# Patient Record
Sex: FEMALE | Race: OTHER | URBAN - METROPOLITAN AREA
[De-identification: names, ages, dates, MRNs, and addresses within clinical notes are randomized per-mention and may not be internally consistent; named-entity substitution may affect disease eponyms.]

---

## 2022-04-04 ENCOUNTER — EMERGENCY (EMERGENCY)
Facility: HOSPITAL | Age: 52
LOS: 0 days | Discharge: HOME | End: 2022-04-04
Attending: EMERGENCY MEDICINE
Payer: MEDICAID

## 2022-04-04 VITALS
WEIGHT: 184.97 LBS | TEMPERATURE: 98 F | RESPIRATION RATE: 14 BRPM | SYSTOLIC BLOOD PRESSURE: 146 MMHG | HEART RATE: 85 BPM | HEIGHT: 64 IN | DIASTOLIC BLOOD PRESSURE: 74 MMHG | OXYGEN SATURATION: 98 %

## 2022-04-04 DIAGNOSIS — M72.2 PLANTAR FASCIAL FIBROMATOSIS: ICD-10-CM

## 2022-04-04 DIAGNOSIS — M79.671 PAIN IN RIGHT FOOT: ICD-10-CM

## 2022-04-04 PROCEDURE — 73610 X-RAY EXAM OF ANKLE: CPT | Mod: 26,RT

## 2022-04-04 PROCEDURE — 99283 EMERGENCY DEPT VISIT LOW MDM: CPT

## 2022-04-04 RX ORDER — IBUPROFEN 200 MG
600 TABLET ORAL ONCE
Refills: 0 | Status: COMPLETED | OUTPATIENT
Start: 2022-04-04 | End: 2022-04-04

## 2022-04-04 RX ORDER — IBUPROFEN 200 MG
1 TABLET ORAL
Qty: 20 | Refills: 0
Start: 2022-04-04 | End: 2022-04-08

## 2022-04-04 RX ADMIN — Medication 600 MILLIGRAM(S): at 10:12

## 2022-04-04 NOTE — ED PROVIDER NOTE - OBJECTIVE STATEMENT
51 year old female with no PMH who presents to the ED for right foot pain for past 15 days. Pt states she had a fall 4 years ago where she twisted her ankle. Pt reports pain is in the heel that is worse in the morning but improves as she walks.

## 2022-04-04 NOTE — ED ADULT TRIAGE NOTE - CHIEF COMPLAINT QUOTE
Right ankle pain and swelling s/p fracture 4 years ago. Patient unable to put entire foot down when walking.

## 2022-04-04 NOTE — ED PROVIDER NOTE - CARE PROVIDER_API CALL
Demond Hernandes (DO)  Orthopaedic Surgery  [address] [place] [address]  Phone: [phone]  Fax: [phone]  Follow Up Time: 4-6 Days

## 2022-04-04 NOTE — ED ADULT NURSE NOTE - OBJECTIVE STATEMENT
Patient received with complaint of pain to R ankle since last night, pt states she sprained her ankle 4 years ago. Ice pack applied to site

## 2022-04-04 NOTE — ED PROVIDER NOTE - PATIENT PORTAL LINK FT
You can access the FollowMyHealth Patient Portal offered by [facility] by registering at the following website: [email or web address]. By joining [employer]’s FollowMyHealth portal, you will also be able to view your health information using other applications (apps) compatible with our system.

## 2022-04-04 NOTE — ED PROVIDER NOTE - CLINICAL SUMMARY MEDICAL DECISION MAKING FREE TEXT BOX
DDx: plantar fasciitis/ unlikely fracture given no trauma recently.  Plan: Pain control, X-ray, stretching exercises, and d/c.

## 2022-04-04 NOTE — ED PROVIDER NOTE - MUSCULOSKELETAL, MLM
Spine appears normal, range of motion is not limited, pain with dorsiflexion in heel, slight tenderness in heel, no ankle pain or swelling.

## 2022-04-04 NOTE — ED PROVIDER NOTE - PROGRESS NOTE DETAILS
Pt is feeling better, xray negative for acute fracuture on my read. Pt is ambulating and bearing weight. Pt given return precautions medications, and is ready for d/c.

## 2022-04-04 NOTE — ED PROVIDER NOTE - CPE EDP GASTRO NORM
Health Maintenance Due   Topic Date Due   • Shingles Vaccine (1 of 2) 04/05/2008   • DTaP/Tdap/Td Vaccine (1 - Tdap) 06/14/2008   • Influenza Vaccine (1) 08/01/2018   • Diabetes Eye Exam  12/29/2018       Patient is due for topics as listed above but is not proceeding with Immunization(s) Influenza and shingles at this time.      Vaccine Information Statement(s) for was given today. This has been reviewed, questions answered, and verbal consent given by Patient for injection(s) and administration of Diphtheria/Tetanus/Pertussis (Dtap).        Patient tolerated without incident. See immunization grid for documentation.     normal...

## 2022-08-14 ENCOUNTER — EMERGENCY (EMERGENCY)
Facility: HOSPITAL | Age: 52
LOS: 0 days | Discharge: HOME | End: 2022-08-14
Attending: EMERGENCY MEDICINE

## 2022-08-14 VITALS
TEMPERATURE: 98 F | WEIGHT: 182.1 LBS | SYSTOLIC BLOOD PRESSURE: 105 MMHG | HEIGHT: 64 IN | DIASTOLIC BLOOD PRESSURE: 69 MMHG | HEART RATE: 88 BPM | OXYGEN SATURATION: 97 % | RESPIRATION RATE: 16 BRPM

## 2022-08-14 DIAGNOSIS — N39.0 URINARY TRACT INFECTION, SITE NOT SPECIFIED: ICD-10-CM

## 2022-08-14 DIAGNOSIS — K92.1 MELENA: ICD-10-CM

## 2022-08-14 DIAGNOSIS — R10.10 UPPER ABDOMINAL PAIN, UNSPECIFIED: ICD-10-CM

## 2022-08-14 DIAGNOSIS — M54.50 LOW BACK PAIN, UNSPECIFIED: ICD-10-CM

## 2022-08-14 PROBLEM — Z78.9 OTHER SPECIFIED HEALTH STATUS: Chronic | Status: ACTIVE | Noted: 2022-04-04

## 2022-08-14 LAB
APPEARANCE UR: ABNORMAL
BACTERIA # UR AUTO: ABNORMAL
BILIRUB UR-MCNC: NEGATIVE — SIGNIFICANT CHANGE UP
COD CRY URNS QL: ABNORMAL
COLOR SPEC: YELLOW — SIGNIFICANT CHANGE UP
COMMENT - URINE: SIGNIFICANT CHANGE UP
DIFF PNL FLD: ABNORMAL
EPI CELLS # UR: SIGNIFICANT CHANGE UP
GLUCOSE UR QL: NEGATIVE MG/DL — SIGNIFICANT CHANGE UP
GRAN CASTS # UR COMP ASSIST: ABNORMAL /LPF
HYALINE CASTS # UR AUTO: ABNORMAL /LPF
KETONES UR-MCNC: NEGATIVE — SIGNIFICANT CHANGE UP
LEUKOCYTE ESTERASE UR-ACNC: ABNORMAL
NITRITE UR-MCNC: NEGATIVE — SIGNIFICANT CHANGE UP
PH UR: 7 — SIGNIFICANT CHANGE UP (ref 5–8)
PROT UR-MCNC: 15 MG/DL
RBC CASTS # UR COMP ASSIST: SIGNIFICANT CHANGE UP /HPF (ref 0–4)
SP GR SPEC: 1.01 — SIGNIFICANT CHANGE UP (ref 1.01–1.02)
UROBILINOGEN FLD QL: NEGATIVE MG/DL — SIGNIFICANT CHANGE UP
WBC UR QL: SIGNIFICANT CHANGE UP

## 2022-08-14 PROCEDURE — 74176 CT ABD & PELVIS W/O CONTRAST: CPT | Mod: 26,MA

## 2022-08-14 PROCEDURE — 99284 EMERGENCY DEPT VISIT MOD MDM: CPT

## 2022-08-14 RX ORDER — KETOROLAC TROMETHAMINE 30 MG/ML
15 SYRINGE (ML) INJECTION ONCE
Refills: 0 | Status: DISCONTINUED | OUTPATIENT
Start: 2022-08-14 | End: 2022-08-14

## 2022-08-14 RX ORDER — METHOCARBAMOL 500 MG/1
1 TABLET, FILM COATED ORAL
Qty: 15 | Refills: 0
Start: 2022-08-14 | End: 2022-08-18

## 2022-08-14 RX ORDER — CEPHALEXIN 500 MG
250 CAPSULE ORAL ONCE
Refills: 0 | Status: COMPLETED | OUTPATIENT
Start: 2022-08-14 | End: 2022-08-14

## 2022-08-14 RX ORDER — CEPHALEXIN 500 MG
1 CAPSULE ORAL
Qty: 40 | Refills: 0
Start: 2022-08-14 | End: 2022-08-23

## 2022-08-14 RX ORDER — SODIUM CHLORIDE 9 MG/ML
1000 INJECTION INTRAMUSCULAR; INTRAVENOUS; SUBCUTANEOUS ONCE
Refills: 0 | Status: COMPLETED | OUTPATIENT
Start: 2022-08-14 | End: 2022-08-14

## 2022-08-14 RX ORDER — CYCLOBENZAPRINE HYDROCHLORIDE 10 MG/1
10 TABLET, FILM COATED ORAL ONCE
Refills: 0 | Status: COMPLETED | OUTPATIENT
Start: 2022-08-14 | End: 2022-08-14

## 2022-08-14 RX ORDER — IBUPROFEN 200 MG
1 TABLET ORAL
Qty: 15 | Refills: 0
Start: 2022-08-14 | End: 2022-08-18

## 2022-08-14 RX ADMIN — CYCLOBENZAPRINE HYDROCHLORIDE 10 MILLIGRAM(S): 10 TABLET, FILM COATED ORAL at 14:15

## 2022-08-14 RX ADMIN — Medication 250 MILLIGRAM(S): at 14:47

## 2022-08-14 RX ADMIN — Medication 15 MILLIGRAM(S): at 14:29

## 2022-08-14 NOTE — ED PROVIDER NOTE - MUSCULOSKELETAL MINIMAL EXAM
increases tenderness bilateral para lumbar muscle with lumbar flexion/atraumatic/normal range of motion/neck supple/motor intact/TENDERNESS

## 2022-08-14 NOTE — ED PROVIDER NOTE - PROGRESS NOTE DETAILS
Pt sts she is feeling much better pt is given the test reports and advised to follow up with Dr. Castillo urologist and Dr. Drake spinal for further managements.

## 2022-08-14 NOTE — ED ADULT TRIAGE NOTE - CHIEF COMPLAINT QUOTE
Lower back pains x 2 weeks but pain has worsen today, denies known injuries. Observed blood in stool few days prior to onset of pain.

## 2022-08-14 NOTE — ED PROVIDER NOTE - CONSTITUTIONAL, MLM
normal... Well appearing, awake, alert, oriented to person, place, time/situation and in no apparent distress. Speaking in clear full sentences no nasal flaring no shoulders retractions no diaphoresis appears very comfortable sitting up in the chair

## 2022-08-14 NOTE — ED PROVIDER NOTE - OBJECTIVE STATEMENT
51 years old female walked in with her son c/o bilateral lower back pain increases to lean fore arias and pain radiates to bilateral lower abd x 2 wks. Pt also c/o on episode of blood in the stool three weeks ago and no more blood noted in stool now. Pt denies recent hx of trauma, headache, dizziness, blurred visions, light sensitivities, focal/distal weakness or numbness, neck/hips/calfs pain, cough, sob, chest pain, nausea, vomiting, fever, chills, abd pain, dysuria, hematuria or vaginal spotting or discharge or irregular bowel movements. 51 years old female walked in with her son c/o bilateral lower back pain increases to lean fore arias and pain radiates to bilateral lower abd x 2 wks. Pt also c/o on episode of blood in the stool three weeks ago and no more blood noted in stool now. Pt denies recent hx of trauma, headache, dizziness, blurred visions, light sensitivities, focal/distal weakness or numbness, neck/hips/calfs pain, cough, uncontrolled urination/bowel movements, sob, chest pain, nausea, vomiting, fever, chills, abd pain, dysuria, hematuria or vaginal spotting or discharge or irregular bowel movements. Pt had Pfizer x 2.

## 2022-08-14 NOTE — ED PROVIDER NOTE - PATIENT PORTAL LINK FT
You can access the FollowMyHealth Patient Portal offered by [facility] by registering at the following website: [email or web address]. By joining [employer]’s FollowMyHealth portal, you will also be able to view your health information using other applications (apps) compatible with our system.

## 2022-08-14 NOTE — ED PROVIDER NOTE - CLINICAL SUMMARY MEDICAL DECISION MAKING FREE TEXT BOX
hx, exam, ua showed calcium oxalate crystal and + blood pt most likely passed the stones. Pt is referred to urologist  Dr. Castillo and Dr. Drake spine,

## 2022-08-14 NOTE — ED PROVIDER NOTE - CARE PROVIDER_API CALL
Scott Castillo)  Urology  [address] [place] [address]  Phone: [phone]  Fax: [phone]  Follow Up Time:     Yovani Drake (DO)  Orthopaedic Surgery Surgery  [address] [facility], [place] [address]  Phone: [phone]  Fax: [phone]  Follow Up Time:

## 2022-08-15 LAB
CULTURE RESULTS: SIGNIFICANT CHANGE UP
SPECIMEN SOURCE: SIGNIFICANT CHANGE UP

## 2023-11-18 NOTE — ED PROVIDER NOTE - IV ALTEPLASE INCLUSION HIDDEN
Patient brought in by EMS. EMS reports patient had been trying to taper herself off of alcohol and believes she may have had a seizure as she lost consciousness and woke up and had urinated herself. Patient reports her old dog used to be able to tell when she had seizures but the new dog does not.   Patient got 4 mg Zofran en route and 500 ml of NS en route.     Patient reports her last drink was a few hours ago.        show